# Patient Record
Sex: FEMALE | Employment: UNEMPLOYED | URBAN - METROPOLITAN AREA
[De-identification: names, ages, dates, MRNs, and addresses within clinical notes are randomized per-mention and may not be internally consistent; named-entity substitution may affect disease eponyms.]

---

## 2022-12-09 ENCOUNTER — OFFICE VISIT (OUTPATIENT)
Dept: ORTHOPEDIC SURGERY | Age: 47
End: 2022-12-09
Payer: OTHER GOVERNMENT

## 2022-12-09 VITALS — BODY MASS INDEX: 20.89 KG/M2 | HEIGHT: 66 IN | WEIGHT: 130 LBS

## 2022-12-09 DIAGNOSIS — M25.562 LEFT KNEE PAIN, UNSPECIFIED CHRONICITY: Primary | ICD-10-CM

## 2022-12-09 DIAGNOSIS — S83.242A ACUTE MEDIAL MENISCUS TEAR OF LEFT KNEE, INITIAL ENCOUNTER: ICD-10-CM

## 2022-12-09 NOTE — PROGRESS NOTES
Elana Martins (: 1975) is a 52 y.o. female, patient, here for evaluation of the following chief complaint(s):  Knee Pain (Left knee pain started a few months ago /No injury or fall reported/Unable to do any lower body exercises due to pain )       HPI:    If complaint is left knee pain. Patient's issue began in 2022. No injury. She is very active. Exercises most days. Pain localizes to the medial side of her left knee. It is associated with mechanical symptoms of clicking and catching. No Known Allergies    No current outpatient medications on file. No current facility-administered medications for this visit. Past Medical History:   Diagnosis Date    Endometriosis     resolved        History reviewed. No pertinent surgical history. History reviewed. No pertinent family history. Social History     Socioeconomic History    Marital status:      Spouse name: Not on file    Number of children: Not on file    Years of education: Not on file    Highest education level: Not on file   Occupational History    Not on file   Tobacco Use    Smoking status: Never     Passive exposure: Never    Smokeless tobacco: Never   Vaping Use    Vaping Use: Never used   Substance and Sexual Activity    Alcohol use: Never    Drug use: Never    Sexual activity: Not on file   Other Topics Concern    Not on file   Social History Narrative    Not on file     Social Determinants of Health     Financial Resource Strain: Not on file   Food Insecurity: Not on file   Transportation Needs: Not on file   Physical Activity: Not on file   Stress: Not on file   Social Connections: Not on file   Intimate Partner Violence: Not on file   Housing Stability: Not on file       ROS    Positive for: Musculoskeletal  Last edited by Jace Dean on 2022  2:49 PM.            Vitals:  Ht 5' 6\" (1.676 m)   Wt 130 lb (59 kg)   BMI 20.98 kg/m²    Body mass index is 20.98 kg/m².     PHYSICAL EXAM:  Exam  of left lower extremity shows a painless left hip. Alignment of the left knee is neutral.  There is no instability. She has a mild effusion. Pain localizes to the medial joint. Patellofemoral joint has no crepitation. Negative patellofemoral grind test.  She has a positive medial Teri's test.    Left lower extremity sensate and well perfused. IMAGING:  XR Results (most recent):  Results from Appointment encounter on 12/09/22    XR KNEE LT 3 V    Narrative  Views left knee. Normal joint space. No evidence of avascular necrosis, fracture, acute process. ASSESSMENT/PLAN:  1. Left knee pain, unspecified chronicity  -     XR KNEE LT 3 V; Future  -     MRI KNEE LT WO CONT; Future    Nearing 7 months of symptoms and active healthy person. Mechanical symptoms as well as a positive medial Teri's test.  Normal x-rays. MRI left knee was ordered. We will make arrangements for the patient to follow-up with one of the sports medicine physicians. An electronic signature was used to authenticate this note.   --Noah Donaldson MD

## 2022-12-29 ENCOUNTER — OFFICE VISIT (OUTPATIENT)
Dept: ORTHOPEDIC SURGERY | Age: 47
End: 2022-12-29
Payer: OTHER GOVERNMENT

## 2022-12-29 VITALS — BODY MASS INDEX: 20.89 KG/M2 | WEIGHT: 130 LBS | HEIGHT: 66 IN

## 2022-12-29 DIAGNOSIS — S83.242A ACUTE MEDIAL MENISCUS TEAR OF LEFT KNEE, INITIAL ENCOUNTER: Primary | ICD-10-CM

## 2022-12-29 PROCEDURE — 99214 OFFICE O/P EST MOD 30 MIN: CPT | Performed by: ORTHOPAEDIC SURGERY

## 2022-12-29 NOTE — PROGRESS NOTES
Felicita Leblanc (: 1975) is a 52 y.o. female, patient, here for evaluation of the following chief complaint(s):  Knee Pain (left)       HPI:    Chief complaint is left knee pain. Is an MRI shows medial meniscus tear. Her pain localizes to her medial meniscus. She has had persistent symptoms not responding to exercise program as well as NSAIDs. She presents today to discuss her MRI results. She is inclined to have surgery as she is very active and this has significantly limited her activities of daily living. No Known Allergies    No current outpatient medications on file. No current facility-administered medications for this visit. Past Medical History:   Diagnosis Date    Endometriosis     resolved        History reviewed. No pertinent surgical history. History reviewed. No pertinent family history. Social History     Socioeconomic History    Marital status:      Spouse name: Not on file    Number of children: Not on file    Years of education: Not on file    Highest education level: Not on file   Occupational History    Not on file   Tobacco Use    Smoking status: Never     Passive exposure: Never    Smokeless tobacco: Never   Vaping Use    Vaping Use: Never used   Substance and Sexual Activity    Alcohol use: Never    Drug use: Never    Sexual activity: Not on file   Other Topics Concern    Not on file   Social History Narrative    Not on file     Social Determinants of Health     Financial Resource Strain: Not on file   Food Insecurity: Not on file   Transportation Needs: Not on file   Physical Activity: Not on file   Stress: Not on file   Social Connections: Not on file   Intimate Partner Violence: Not on file   Housing Stability: Not on file             Vitals:  Ht 5' 6\" (1.676 m)   Wt 130 lb (59 kg)   BMI 20.98 kg/m²    Body mass index is 20.98 kg/m².     PHYSICAL EXAM:  On physical examination left knee has a positive medial Teri's test and pain along the medial joint line. Patellofemoral joint is negative. She has a small knee effusion. IMAGING:  No new imaging today. MRI report is documented. Her x-rays show stage 0 osteoarthritis of the left knee. ASSESSMENT/PLAN:  1. Acute medial meniscus tear of left knee, initial encounter  Persistent symptoms. Not responding to conservative treatment. MRI shows meniscus tear medially. Her symptoms location and dysfunction can all be attributed to the MRI findings. She would like to proceed with surgery. I did discuss a shot of corticosteroid today but she would rather have something definitive done and get back to her usual activities. Scheduled for arthroscopy left knee partial medial meniscectomy versus repair today. An electronic signature was used to authenticate this note.   --Vasyl Richardson MD

## 2023-01-04 DIAGNOSIS — S83.242A ACUTE MEDIAL MENISCUS TEAR OF LEFT KNEE, INITIAL ENCOUNTER: Primary | ICD-10-CM

## 2023-01-26 NOTE — H&P
Marko Mac (: 1975) is a 52 y.o. female, patient, here for evaluation of the following chief complaint(s):  Knee Pain (left)        HPI:     Chief complaint is left knee pain. Is an MRI shows medial meniscus tear. Her pain localizes to her medial meniscus. She has had persistent symptoms not responding to exercise program as well as NSAIDs. She presents today to discuss her MRI results. She is inclined to have surgery as she is very active and this has significantly limited her activities of daily living. No Known Allergies     No current outpatient medications on file. No current facility-administered medications for this visit. Past Medical History:   Diagnosis Date    Endometriosis       resolved         History reviewed. No pertinent surgical history. History reviewed. No pertinent family history. Social History            Socioeconomic History    Marital status:        Spouse name: Not on file    Number of children: Not on file    Years of education: Not on file    Highest education level: Not on file   Occupational History    Not on file   Tobacco Use    Smoking status: Never       Passive exposure: Never    Smokeless tobacco: Never   Vaping Use    Vaping Use: Never used   Substance and Sexual Activity    Alcohol use: Never    Drug use: Never    Sexual activity: Not on file   Other Topics Concern    Not on file   Social History Narrative    Not on file      Social Determinants of Health      Financial Resource Strain: Not on file   Food Insecurity: Not on file   Transportation Needs: Not on file   Physical Activity: Not on file   Stress: Not on file   Social Connections: Not on file   Intimate Partner Violence: Not on file   Housing Stability: Not on file                  Vitals:  Ht 5' 6\" (1.676 m)   Wt 130 lb (59 kg)   BMI 20.98 kg/m²    Body mass index is 20.98 kg/m².      PHYSICAL EXAM:  On physical examination left knee has a positive medial Teri's test and pain along the medial joint line. Patellofemoral joint is negative. She has a small knee effusion. IMAGING:  No new imaging today. MRI report is documented. Her x-rays show stage 0 osteoarthritis of the left knee. ASSESSMENT/PLAN:  1. Acute medial meniscus tear of left knee, initial encounter  Persistent symptoms. Not responding to conservative treatment. MRI shows meniscus tear medially. Her symptoms location and dysfunction can all be attributed to the MRI findings. She would like to proceed with surgery. I did discuss a shot of corticosteroid today but she would rather have something definitive done and get back to her usual activities. Scheduled for arthroscopy left knee partial medial meniscectomy versus repair today. An electronic signature was used to authenticate this note.   --Lizy Dominguez MD Rifampin Pregnancy And Lactation Text: This medication is Pregnancy Category C and it isn't know if it is safe during pregnancy. It is also excreted in breast milk and should not be used if you are breast feeding.

## 2023-01-27 ENCOUNTER — HOSPITAL ENCOUNTER (OUTPATIENT)
Dept: PREADMISSION TESTING | Age: 48
End: 2023-01-27
Payer: OTHER GOVERNMENT

## 2023-01-27 VITALS
DIASTOLIC BLOOD PRESSURE: 68 MMHG | HEART RATE: 62 BPM | TEMPERATURE: 97.8 F | HEIGHT: 66 IN | SYSTOLIC BLOOD PRESSURE: 105 MMHG | BODY MASS INDEX: 21.9 KG/M2 | WEIGHT: 136.24 LBS

## 2023-01-27 LAB — HCG SERPL QL: NEGATIVE

## 2023-01-27 PROCEDURE — 36415 COLL VENOUS BLD VENIPUNCTURE: CPT

## 2023-01-27 PROCEDURE — 84703 CHORIONIC GONADOTROPIN ASSAY: CPT

## 2023-01-27 NOTE — PERIOP NOTES
[address] [facility] INSTRUCTIONS  ORTHOPAEDIC    Surgery Date:   2/3/23    Your surgeon's office or [facility] staff will call you between 4 PM- 8 PM the day before surgery with your arrival time. If your surgery is on a Monday, you will receive a call the preceding Friday. Please report to [facility] Patient Access/Admitting on the 1st floor. Bring your insurance card, photo identification, and any copayment (if applicable). If you are going home the same day of your surgery, you must have a responsible adult to drive you home. You need to have a responsible adult to stay with you the first 24 hours after surgery and you should not drive a car for 24 hours following your surgery. Do NOT eat any solid foods after midnight the night before surgery including candy, mints or gum. You may drink clear liquids from midnight until 1 hour prior to arrival time. You may drink up to 12 ounces at one time every 4 hours. Do NOT drink alcohol or smoke 24 hours before surgery. STOP smoking for 14 days prior as it helps with breathing and healing after surgery. If your arrival time is 3pm or later, you may eat a light breakfast before 8am (toast, bagel-no butter, black coffee, plain tea, fruit juice-no pulp) Please note special instructions, if applicable, below for medications. If you are being admitted to the hospital,please leave personal belongings/luggage in your car until you have an assigned hospital room number. Please wear comfortable clothes. Wear your glasses instead of contacts. We ask that all money, jewelry and valuables be left at home. Wear no make up, particularly mascara, the day of surgery. All body piercings, rings, and jewelry need to be removed and left at home. Please remove any nail polish or artificial nails from your fingernails. Please wear your hair loose or down. Please no pony-tails, buns, or any metal hair accessories.  If you shower the morning of surgery, please do not apply any lotions or powders afterwards. You may wear deodorant. Do not shave any body area within 24 hours of your surgery. Please follow all instructions to avoid any potential surgical cancellation. Should your physical condition change, (i.e. fever, cold, flu, etc.) please notify your surgeon as soon as possible. It is important to be on time. If a situation occurs where you may be delayed, please call:  [phone] / [phone] on the day of surgery. The Preadmission Testing staff can be reached at [phone]. Special instructions: NONE    No current facility-administered medications for this encounter. No current outpatient medications on file. YOU MUST ONLY TAKE THESE MEDICATIONS THE MORNING OF SURGERY WITH A SIP OF WATER: NONE  MEDICATIONS TO TAKE THE MORNING OF SURGERY ONLY IF NEEDED: NONE  HOLD these prescription medications BEFORE Surgery: NONE  Ask your surgeon/prescribing physician about when/if to STOP taking these medications: NONE  Stop any non-steroidal anti-inflammatory drugs (i.e. Ibuprofen, Naproxen, Advil, Aleve) 3 days before surgery. You may take Tylenol. STOP all vitamins and herbal supplements 1 week prior to  surgery. If you are currently taking Plavix, Coumadin, or any other blood-thinning/anticoagulant medication contact your prescribing physician for instructions. Preventing Infections Before and After - Your Surgery    IMPORTANT INSTRUCTIONS    You play an important role in your health and preparation for surgery. To reduce the germs on your skin you will need to shower with CHG soap (Chorhexidine gluconate 4%) two times before surgery. CHG soap (Hibiclens, Hex-A-Clens or store brand)  CHG soap will be provided at your Preadmission Testing (PAT) appointment. If you do not have a PAT appointment before surgery, you may arrange to  CHG soap from our office or purchase CHG soap at a pharmacy, grocery or department store.   You need to purchase TWO 4 ounce bottles to use for your 2 showers. Steps to follow:  [place] your hair with your normal shampoo and your body with regular soap and rinse well to remove shampoo and soap from your skin. Wet a clean washcloth and turn off the shower. Put CHG soap on washcloth and apply to your entire body from the neck down. Do not use on your head, face or private parts(genitals). Do not use CHG soap on open sores, wounds or areas of skin irritation. Wash you body gently for 5 minutes. Do not wash your skin too hard. This soap does not create lather. Pay special attention to your underarms and from your belly button to your feet. Turn the shower back on and rinse well to get CHG soap off your body. Pat your skin dry with a clean, dry towel. Do not apply lotions or moisturizer. Put on clean clothes and sleep on fresh bed sheets and do not allow pets to sleep with you. Shower with CHG soap 2 times before your surgery  The evening before your surgery  The morning of your surgery      Tips to help prevent infections after your surgery:  Protect your surgical wound from germs:  Hand washing is the most important thing you and your caregivers can do to prevent infections. Keep your bandage clean and dry! Do not touch your surgical wound. Use clean, freshly washed towels and washcloths every time you shower; do not share bath linens with others. Until your surgical wound is healed, wear clothing and sleep on bed linens each day that are clean and freshly washed. Do not allow pets to sleep in your bed with you or touch your surgical wound. Do not smoke - smoking delays wound healing. This may be a good time to stop smoking. If you have diabetes, it is important for you to manage your blood sugar levels properly before your surgery as well as after your surgery. Poorly managed blood sugar levels slow down wound healing and prevent you from healing completely.     CHG liquid soap (Chlorhexidine 4%, Hibiclens, Connie-A-Clens or store brand) at a pharmacy or grocery store. Wash your hair with your normal shampoo and your body with regular soap and rinse well to remove shampoo and soap from your skin. Wet a clean washcloth and turn off the shower. Put CHG soap on washcloth and apply to your entire body from the neck down. Do not use on your head, face or private parts(genitals). Do not use CHG soap on open sores, wounds or areas of skin irritation. Wash your body gently for 5 minutes. Do not wash your skin too hard. This soap does not create lather. Pay special attention to your underarms and from your belly button to your feet. Turn the shower back on and rinse well to get CHG soap off your body. Pat your skin dry with a clean, dry towel. Do not apply lotions or moisturizer. Put on clean clothes and sleep on fresh bed sheets the night before surgery. Do not allow pets to sleep with you. Eating and Drinking Before Surgery    You may eat a regular dinner at the usual time on the day before your surgery. Do NOT eat any solid foods after midnight unless your arrival time at the hospital is 3pm or later. You may drink clear liquids only from 12 midnight until 1 hours prior to your arrival time at the hospital on the day of your surgery. Do NOT drink alcohol. Clear liquids include:  Water  Fruit juices without pulp( i.e. apple juice)  Carbonated beverages  Black coffee (no cream/milk)  Tea (no cream/milk)  Gatorade  You may drink up to 12-16 ounces at one time every 4 hours between the hours of midnight and 1 hour before your arrival time at the hospital. Example- if your arrival time at the hospital is 6am, you may drink 12-16 ounces of clear liquids no later than 5am.  If your arrival time at the hospital is 3pm or later, you may eat a light breakfast before 8am.  A light breakfast includes:   Toast or bagel (no butter)  Black coffee (no cream/milk)  Tea (no cream/milk)  Fruit juices without pulp ( i.e. apple juice)  Do NOT eat meat, eggs, vegetables or fruit  If you have any questions, please contact your surgeon's office. Patient Information Regarding COVID Restrictions    Day of Procedure    Please park in the parking deck or any designated visitor parking lot. Enter the facility through the Main Entrance of the hospital.  On the day of surgery, please provide the cell phone number of the person who will be waiting for you to the Patient Access representative at the time of registration. Masks are highly recommended in the hospital, but not required. Once your procedure and the immediate recovery period is completed, a nurse in the recovery area will contact your designated visitor to inform them of your room number or to otherwise review other pertinent information regarding your care. Social distancing practices are strongly encouraged in waiting areas and the cafeteria. The patient was contacted in person. She verbalized understanding of all instructions does not  need reinforcement.

## 2023-01-30 NOTE — PROGRESS NOTES
Subjective: (As above and below)     Chief Complaint   Patient presents with    Establish Care    Pre-op Exam       Dennis Jc is a 52 y.o. female with no significant medical history who presents to the office to establish care and pre-op clearance. Preventative:   LMP: Started sky-menopause, skipping months. Pap smear: [facility]. Mammogram: No family history of breast cancer. Colonoscopy: Due   Immunization:   Refused all vaccinations at this time. Current medications: Only taking supplements. Vitamin A, B, C, D, E, selenium, CoQ10, K2, beef liver, probiotics, B3, B-complex, calcium, others she cannot remember but has stopped taking supplements 1 week prior to surgery. No other concerns today. Nutrition Hx:  Diet: Breakfast: smoothie and supplements, Lunch, Salad and organic protein, Dinner: vegetables, protein Snacks: nuts. Makes her own bake goods, cheese, chocolate. Water, tea and milk. Exercise: Walks everyday. Unable to run due to meniscus tear      Preo-op:   Procedure/Surgery: Left knee scope   Date of Procedure/Surgery: 2/3/2023   Surgeon: Dr. Mahajan Sharonda: [address]   Primary Physician: None       Reason for surgery: Meniscus repair of left knee    Latex Allergy: No   Recent use of: No recent use of aspirin (ASA), NSAIDS or steroids  Tetanus up to date: tetanus status unknown to the patient  Anesthesia Complications: None  History of abnormal bleeding : None  History of Blood Transfusions: no  Health Care Directive or Living Will: no  Denies KILEY symptoms. Denies any history or cardiac disease or family history of cardiac disease. Per patient provider did not need EKG or lab work prior to surgery. Current medications: Only taking supplements.    Vitamin A, B, C, D, E, selenium, CoQ10, K2, beef liver, probiotics, B3, B-complex, calcium, others she cannot remember but has stopped taking supplements 1 week prior to surgery. Labs: Routine labs done today. Reviewed and agree with Nurse Note duplicated in this note. Reviewed PmHx, RxHx, FmHx, SocHx, AllgHx and updated in chart. No current outpatient medications on file. No current facility-administered medications for this visit.       No Known Allergies   Past Medical History:   Diagnosis Date    Endometriosis     resolved      Past Surgical History:   Procedure Laterality Date    HX GYN  1997    LAPAROSCOPIC SURGERY FOR ENDOMETRIOSIS    HX HEENT Bilateral 2019    LASIK      Family History   Problem Relation Age of Onset    Cancer Mother         B CELL LYMPHOMA    Mitral valve prolapse Father     Cancer Brother         bone cancer    Anesth Problems Neg Hx       Social History     Socioeconomic History    Marital status:      Spouse name: Not on file    Number of children: Not on file    Years of education: Not on file    Highest education level: Not on file   Occupational History    Not on file   Tobacco Use    Smoking status: Never     Passive exposure: Never    Smokeless tobacco: Never   Vaping Use    Vaping Use: Never used   Substance and Sexual Activity    Alcohol use: Never    Drug use: Never    Sexual activity: Yes     Partners: Male     Birth control/protection: None     Comment: Only with my  of 26 years   Other Topics Concern    Not on file   Social History Narrative    Not on file     Social Determinants of Health     Financial Resource Strain: Not on file   Food Insecurity: Not on file   Transportation Needs: Not on file   Physical Activity: Sufficiently Active    Days of Exercise per Week: 7 days    Minutes of Exercise per Session: 60 min   Stress: Not on file   Social Connections: Not on file   Intimate Partner Violence: Not At Risk    Fear of Current or Ex-Partner: No    Emotionally Abused: No    Physically Abused: No    Sexually Abused: No   Housing Stability: Not on file       Review of Systems   Constitutional: Negative for chills, diaphoresis, fever, malaise/fatigue and weight loss. HENT: Negative. Eyes: Negative. Respiratory:  Negative for cough, hemoptysis, sputum production, shortness of breath and wheezing. Cardiovascular:  Negative for chest pain, palpitations, orthopnea, claudication, leg swelling and PND. Gastrointestinal:  Negative for abdominal pain, blood in stool, constipation, diarrhea, heartburn, melena, nausea and vomiting. Genitourinary:  Negative for dysuria, flank pain, frequency, hematuria and urgency. Musculoskeletal: Negative. Skin: Negative. Neurological:  Negative for dizziness, tingling, tremors, sensory change, speech change, focal weakness, seizures, loss of consciousness, weakness and headaches. Endo/Heme/Allergies: Negative. Psychiatric/Behavioral: Negative. Objective:     Physical Examination:      Visit Vitals  BP (!) 99/49 (BP 1 Location: Left upper arm, BP Patient Position: Sitting, BP Cuff Size: Adult)   Pulse 69   Temp 98.3 °F (36.8 °C) (Temporal)   Resp 17   Ht 5' 6\" (1.676 m)   Wt 138 lb (62.6 kg)   LMP 01/28/2023   SpO2 100%   BMI 22.27 kg/m²     Gen: alert, oriented, no acute distress  Ears: external auditory canals clear, TMs without erythema or effusion  Eyes: pupils equal round reactive to light, sclera clear, conjunctiva clear  Oral: moist mucus membranes, no oral lesions, no pharyngeal inflammation or exudate  Neck: thyroid symmetric and not enlarged, no carotid bruits, no jugular vein distention  Resp: no increase work of breathing, lungs clear to ausculation bilaterally, no wheezing, rales or rhonchi  CV: S1, S2 normal. No murmurs, rubs, or gallops. Abd: soft, not tender, not distended. No hepatosplenomegaly. Normal bowel sounds. No hernias. Neuro: cranial nerves intact, normal strength and movement in all extremities, reflexes and sensation intact and symmetric.   Skin: no lesion or rash  Extremities: no cyanosis or edema             3 most recent PHQ Screens 2/1/2023   Little interest or pleasure in doing things Not at all   Feeling down, depressed, irritable, or hopeless Not at all   Total Score PHQ 2 0      Assessment/ Plan:   Differential diagnosis and treatment options reviewed with patient who is in agreement with treatment plan as outlined below. 1. Encounter for medical examination to establish care  Vitals are stable. Patient is asymptomatic with lower diastolic's. Will get release form for pap smear. 2. Preoperative clearance  Encourage patient to be transparent with surgeon and anesthesiologist. Ask questions. Patient denied any cardiac disease, respiratory disease or family history of cardiac/respiratory disease. After reviewing the patient's history & exam she is low risk for cardiac complications. No contraindications to planned surgery     3. Need for hepatitis C screening test  - HEPATITIS C AB; Future    4. Encounter for completion of form with patient    5. Encounter for screening for lipid disorder  - LIPID PANEL; Future    6. Laboratory exam ordered as part of routine general medical examination  - CBC WITH AUTOMATED DIFF; Future  - METABOLIC PANEL, COMPREHENSIVE; Future  - HEMOGLOBIN A1C WITH EAG; Future  - TSH 3RD GENERATION; Future  - AMB POC URINALYSIS DIP STICK AUTO W/O MICRO    7. Colon cancer screening  - OCCULT BLOOD IMMUNOASSAY,DIAGNOSTIC; Future  - OCCULT BLOOD IMMUNOASSAY,DIAGNOSTIC        Follow-up and Dispositions    Return in about 1 year (around 2/1/2024), or if symptoms worsen or fail to improve, for yearly visit . Medication Side Effects and Warnings were discussed with patient: yes  Patient Labs were reviewed: yes  Patient Past Records were reviewed:  yes    Verbal and written instructions (see AVS) provided. Patient expresses understanding and agreement of diagnosis and treatment plan.     Brianna Greenwood NP

## 2023-01-31 NOTE — DISCHARGE INSTRUCTIONS
Knee Arthroscopy Discharge Instructions  Dr. Danuta Donis    Activity:  You should be as active as you can tolerate within the guidelines you have been given. Perform the exercises you have been given twice daily. Use you walker or crutches as directed by your physician. You should rest for one hour twice a day with your feet elevated. Diet:  You may resume your regular home diet. If you are on Coumadin, please be consistent eating green leafy vegetables day to day (Green leafy vegetables contain Vitamin K, which does the opposite affect of Coumadin). You CAN eat them, but again, be consistent day to day. One serving per day is recommended. Pain:  You have been given a prescription for pain control. Take this medication as  directed. Do not take more than prescribed. If your pain is not controlled by the medication you were given, please call your surgeons office. Anticoagulation:   You will need to take Aspirin enteric coated 81 mg once a day for two weeks from the day of surgery. Stool Softeners:  If it important to have regular bowel movements. Pain medications can cause constipation. Stool softeners, warm prune juice, increasing your water intake, and increasing fiber can help in preventing constipation. Do not take laxatives if at all possible except in severe situations. It can result in a vicious cycle between constipation and diarrhea. Ice Pack   Application of ice will prevent and treat inflammation. You should use this three times a day for 20 minutes as needed. Skin Care:  Dressing Changes: You may remove your dressings the day following surgery and place Band Aids over the wound site. Showering: You may shower now. Keep the wound covered and change dressing afterwards, avoiding direct water pressure to the dressing.  NO baths, hot tubs, pools, etc.      Follow up Care:    When you arrive home, please call your surgeon to schedule your follow-up appointment if not already set up for you. Reasons to call your surgeon:    Fever above 101 for more than 24 hours. Persistent pain in the calf or either leg. Pain uncontrolled by your pain medication. Nausea or vomiting  Redness, swelling or drainage from your incision. Numbness, tingling, or change in your affected extremity. ______________________________________________________________________    Anesthesia Discharge Instructions    After general anesthesia or intervenous sedation, for 24 hours or while taking prescription Narcotics:  Limit your activities  Do not drive or operate hazardous machinery  If you have not urinated within 8 hours after discharge, please contact your surgeon on call. Do not make important personal or business decisions  Do not drink alcoholic beverages    Report the following to your surgeon:  Excessive pain, swelling, redness or odor of or around the surgical area  Temperature over 100.5 degrees  Nausea and vomiting lasting longer than 4 hours or if unable to take medication  Any signs of decreased circulation or nerve impairment to extremity:  Change in color, persistent numbness, tingling, coldness or increased pain.   Any questions          ** you had Oxycodone 5mg tablet at 12:23 pm, you can have your next pill at 4:23 pm or later today**

## 2023-02-01 ENCOUNTER — OFFICE VISIT (OUTPATIENT)
Dept: FAMILY MEDICINE CLINIC | Age: 48
End: 2023-02-01
Payer: OTHER GOVERNMENT

## 2023-02-01 VITALS
BODY MASS INDEX: 22.18 KG/M2 | TEMPERATURE: 98.3 F | OXYGEN SATURATION: 100 % | WEIGHT: 138 LBS | HEART RATE: 69 BPM | SYSTOLIC BLOOD PRESSURE: 99 MMHG | DIASTOLIC BLOOD PRESSURE: 49 MMHG | HEIGHT: 66 IN | RESPIRATION RATE: 17 BRPM

## 2023-02-01 DIAGNOSIS — Z12.11 COLON CANCER SCREENING: ICD-10-CM

## 2023-02-01 DIAGNOSIS — Z02.89 ENCOUNTER FOR COMPLETION OF FORM WITH PATIENT: ICD-10-CM

## 2023-02-01 DIAGNOSIS — Z13.220 ENCOUNTER FOR SCREENING FOR LIPID DISORDER: ICD-10-CM

## 2023-02-01 DIAGNOSIS — Z00.00 LABORATORY EXAM ORDERED AS PART OF ROUTINE GENERAL MEDICAL EXAMINATION: ICD-10-CM

## 2023-02-01 DIAGNOSIS — Z01.818 PREOPERATIVE CLEARANCE: ICD-10-CM

## 2023-02-01 DIAGNOSIS — Z00.00 ENCOUNTER FOR MEDICAL EXAMINATION TO ESTABLISH CARE: Primary | ICD-10-CM

## 2023-02-01 DIAGNOSIS — Z11.59 NEED FOR HEPATITIS C SCREENING TEST: ICD-10-CM

## 2023-02-01 NOTE — PROGRESS NOTES
Chief Complaint   Patient presents with    Establish Care    Pre-op Exam     Health Maintenance reviewed     1. Have you been to the ER, urgent care clinic since your last visit? Hospitalized since your last visit? No     2. Have you seen or consulted any other health care providers outside of the [address] [place] [address] since your last visit? Include any pap smears or colon screening.   No

## 2023-02-02 ENCOUNTER — ANESTHESIA EVENT (OUTPATIENT)
Dept: SURGERY | Age: 48
End: 2023-02-02
Payer: OTHER GOVERNMENT

## 2023-02-02 LAB
ALBUMIN SERPL-MCNC: 4.1 G/DL (ref 3.5–5)
ALBUMIN/GLOB SERPL: 1.4 (ref 1.1–2.2)
ALP SERPL-CCNC: 44 U/L (ref 45–117)
ALT SERPL-CCNC: 22 U/L (ref 12–78)
ANION GAP SERPL CALC-SCNC: 1 MMOL/L (ref 5–15)
AST SERPL-CCNC: 10 U/L (ref 15–37)
BASOPHILS # BLD: 0 K/UL (ref 0–0.1)
BASOPHILS NFR BLD: 0 % (ref 0–1)
BILIRUB SERPL-MCNC: 0.4 MG/DL (ref 0.2–1)
BUN SERPL-MCNC: 17 MG/DL (ref 6–20)
BUN/CREAT SERPL: 26 (ref 12–20)
CALCIUM SERPL-MCNC: 9.2 MG/DL (ref 8.5–10.1)
CHLORIDE SERPL-SCNC: 108 MMOL/L (ref 97–108)
CHOLEST SERPL-MCNC: 191 MG/DL
CO2 SERPL-SCNC: 31 MMOL/L (ref 21–32)
CREAT SERPL-MCNC: 0.65 MG/DL (ref 0.55–1.02)
DIFFERENTIAL METHOD BLD: NORMAL
EOSINOPHIL # BLD: 0.1 K/UL (ref 0–0.4)
EOSINOPHIL NFR BLD: 2 % (ref 0–7)
ERYTHROCYTE [DISTWIDTH] IN BLOOD BY AUTOMATED COUNT: 12.8 % (ref 11.5–14.5)
EST. AVERAGE GLUCOSE BLD GHB EST-MCNC: 97 MG/DL
GLOBULIN SER CALC-MCNC: 2.9 G/DL (ref 2–4)
GLUCOSE SERPL-MCNC: 84 MG/DL (ref 65–100)
HBA1C MFR BLD: 5 % (ref 4–5.6)
HCT VFR BLD AUTO: 42.1 % (ref 35–47)
HCV AB SERPL QL IA: NONREACTIVE
HDLC SERPL-MCNC: 76 MG/DL
HDLC SERPL: 2.5 (ref 0–5)
HGB BLD-MCNC: 13.6 G/DL (ref 11.5–16)
IMM GRANULOCYTES # BLD AUTO: 0 K/UL (ref 0–0.04)
IMM GRANULOCYTES NFR BLD AUTO: 0 % (ref 0–0.5)
LDLC SERPL CALC-MCNC: 103.8 MG/DL (ref 0–100)
LYMPHOCYTES # BLD: 1.3 K/UL (ref 0.8–3.5)
LYMPHOCYTES NFR BLD: 23 % (ref 12–49)
MCH RBC QN AUTO: 30.2 PG (ref 26–34)
MCHC RBC AUTO-ENTMCNC: 32.3 G/DL (ref 30–36.5)
MCV RBC AUTO: 93.6 FL (ref 80–99)
MONOCYTES # BLD: 0.3 K/UL (ref 0–1)
MONOCYTES NFR BLD: 6 % (ref 5–13)
NEUTS SEG # BLD: 3.8 K/UL (ref 1.8–8)
NEUTS SEG NFR BLD: 69 % (ref 32–75)
NRBC # BLD: 0 K/UL (ref 0–0.01)
NRBC BLD-RTO: 0 PER 100 WBC
PLATELET # BLD AUTO: 231 K/UL (ref 150–400)
PMV BLD AUTO: 9.7 FL (ref 8.9–12.9)
POTASSIUM SERPL-SCNC: 4.3 MMOL/L (ref 3.5–5.1)
PROT SERPL-MCNC: 7 G/DL (ref 6.4–8.2)
RBC # BLD AUTO: 4.5 M/UL (ref 3.8–5.2)
SODIUM SERPL-SCNC: 140 MMOL/L (ref 136–145)
TRIGL SERPL-MCNC: 56 MG/DL (ref ?–150)
TSH SERPL DL<=0.05 MIU/L-ACNC: 1.36 UIU/ML (ref 0.36–3.74)
VLDLC SERPL CALC-MCNC: 11.2 MG/DL
WBC # BLD AUTO: 5.6 K/UL (ref 3.6–11)

## 2023-02-03 ENCOUNTER — HOSPITAL ENCOUNTER (OUTPATIENT)
Age: 48
Discharge: HOME | End: 2023-02-03
Attending: ORTHOPAEDIC SURGERY | Admitting: ORTHOPAEDIC SURGERY
Payer: OTHER GOVERNMENT

## 2023-02-03 ENCOUNTER — ANESTHESIA (OUTPATIENT)
Dept: SURGERY | Age: 48
End: 2023-02-03
Payer: OTHER GOVERNMENT

## 2023-02-03 VITALS
RESPIRATION RATE: 16 BRPM | BODY MASS INDEX: 21.69 KG/M2 | HEIGHT: 66 IN | WEIGHT: 135 LBS | TEMPERATURE: 98 F | DIASTOLIC BLOOD PRESSURE: 62 MMHG | OXYGEN SATURATION: 100 % | SYSTOLIC BLOOD PRESSURE: 107 MMHG | HEART RATE: 55 BPM

## 2023-02-03 DIAGNOSIS — S83.242D ACUTE MEDIAL MENISCUS TEAR, LEFT, SUBSEQUENT ENCOUNTER: ICD-10-CM

## 2023-02-03 DIAGNOSIS — Z98.890 S/P LEFT KNEE ARTHROSCOPY: Primary | ICD-10-CM

## 2023-02-03 PROBLEM — S83.249A ACUTE MEDIAL MENISCUS TEAR: Status: ACTIVE | Noted: 2023-02-03

## 2023-02-03 LAB — HCG UR QL: NEGATIVE

## 2023-02-03 PROCEDURE — 2709999900 HC NON-CHARGEABLE SUPPLY: Performed by: ORTHOPAEDIC SURGERY

## 2023-02-03 PROCEDURE — 77030000032 HC CUF TRNQT ZIMM -B: Performed by: ORTHOPAEDIC SURGERY

## 2023-02-03 PROCEDURE — 74011250637 HC RX REV CODE- 250/637: Performed by: ANESTHESIOLOGY

## 2023-02-03 PROCEDURE — 74011250636 HC RX REV CODE- 250/636: Performed by: ANESTHESIOLOGY

## 2023-02-03 PROCEDURE — 81025 URINE PREGNANCY TEST: CPT

## 2023-02-03 PROCEDURE — 76010000138 HC OR TIME 0.5 TO 1 HR: Performed by: ORTHOPAEDIC SURGERY

## 2023-02-03 PROCEDURE — 74011000250 HC RX REV CODE- 250: Performed by: ORTHOPAEDIC SURGERY

## 2023-02-03 PROCEDURE — 76210000020 HC REC RM PH II FIRST 0.5 HR: Performed by: ORTHOPAEDIC SURGERY

## 2023-02-03 PROCEDURE — 76060000033 HC ANESTHESIA 1 TO 1.5 HR: Performed by: ORTHOPAEDIC SURGERY

## 2023-02-03 PROCEDURE — 77030002916 HC SUT ETHLN J&J -A: Performed by: ORTHOPAEDIC SURGERY

## 2023-02-03 PROCEDURE — 76210000000 HC OR PH I REC 2 TO 2.5 HR: Performed by: ORTHOPAEDIC SURGERY

## 2023-02-03 PROCEDURE — 77030006884 HC BLD SHV INCIS S&N -B: Performed by: ORTHOPAEDIC SURGERY

## 2023-02-03 PROCEDURE — 74011250637 HC RX REV CODE- 250/637: Performed by: ORTHOPAEDIC SURGERY

## 2023-02-03 PROCEDURE — 77030008753 HC TU IRR IN/OUT FLO S&N -B: Performed by: ORTHOPAEDIC SURGERY

## 2023-02-03 PROCEDURE — 77030018834: Performed by: ORTHOPAEDIC SURGERY

## 2023-02-03 RX ORDER — ONDANSETRON 2 MG/ML
INJECTION INTRAMUSCULAR; INTRAVENOUS AS NEEDED
Status: DISCONTINUED | OUTPATIENT
Start: 2023-02-03 | End: 2023-02-03 | Stop reason: HOSPADM

## 2023-02-03 RX ORDER — SODIUM CHLORIDE 0.9 % (FLUSH) 0.9 %
5-40 SYRINGE (ML) INJECTION EVERY 8 HOURS
Status: DISCONTINUED | OUTPATIENT
Start: 2023-02-03 | End: 2023-02-03 | Stop reason: HOSPADM

## 2023-02-03 RX ORDER — BUPIVACAINE HYDROCHLORIDE AND EPINEPHRINE 5; 5 MG/ML; UG/ML
INJECTION, SOLUTION EPIDURAL; INTRACAUDAL; PERINEURAL AS NEEDED
Status: DISCONTINUED | OUTPATIENT
Start: 2023-02-03 | End: 2023-02-03 | Stop reason: HOSPADM

## 2023-02-03 RX ORDER — HYDROMORPHONE HYDROCHLORIDE 1 MG/ML
0.2 INJECTION, SOLUTION INTRAMUSCULAR; INTRAVENOUS; SUBCUTANEOUS
Status: DISCONTINUED | OUTPATIENT
Start: 2023-02-03 | End: 2023-02-03 | Stop reason: HOSPADM

## 2023-02-03 RX ORDER — OXYCODONE HYDROCHLORIDE 5 MG/1
5-10 TABLET ORAL
Qty: 40 TABLET | Refills: 0 | Status: SHIPPED | OUTPATIENT
Start: 2023-02-03 | End: 2023-02-17

## 2023-02-03 RX ORDER — SODIUM CHLORIDE 9 MG/ML
1000 INJECTION, SOLUTION INTRAVENOUS CONTINUOUS
Status: DISCONTINUED | OUTPATIENT
Start: 2023-02-03 | End: 2023-02-03 | Stop reason: HOSPADM

## 2023-02-03 RX ORDER — CEFAZOLIN SODIUM 1 G/3ML
INJECTION, POWDER, FOR SOLUTION INTRAMUSCULAR; INTRAVENOUS AS NEEDED
Status: DISCONTINUED | OUTPATIENT
Start: 2023-02-03 | End: 2023-02-03 | Stop reason: HOSPADM

## 2023-02-03 RX ORDER — OXYCODONE AND ACETAMINOPHEN 5; 325 MG/1; MG/1
1 TABLET ORAL AS NEEDED
Status: DISCONTINUED | OUTPATIENT
Start: 2023-02-03 | End: 2023-02-03 | Stop reason: HOSPADM

## 2023-02-03 RX ORDER — SODIUM CHLORIDE 0.9 % (FLUSH) 0.9 %
5-40 SYRINGE (ML) INJECTION AS NEEDED
Status: DISCONTINUED | OUTPATIENT
Start: 2023-02-03 | End: 2023-02-03 | Stop reason: HOSPADM

## 2023-02-03 RX ORDER — DIPHENHYDRAMINE HYDROCHLORIDE 50 MG/ML
12.5 INJECTION, SOLUTION INTRAMUSCULAR; INTRAVENOUS AS NEEDED
Status: DISCONTINUED | OUTPATIENT
Start: 2023-02-03 | End: 2023-02-03 | Stop reason: HOSPADM

## 2023-02-03 RX ORDER — FENTANYL CITRATE 50 UG/ML
50 INJECTION, SOLUTION INTRAMUSCULAR; INTRAVENOUS AS NEEDED
Status: DISCONTINUED | OUTPATIENT
Start: 2023-02-03 | End: 2023-02-03 | Stop reason: HOSPADM

## 2023-02-03 RX ORDER — MIDAZOLAM HYDROCHLORIDE 1 MG/ML
1 INJECTION, SOLUTION INTRAMUSCULAR; INTRAVENOUS AS NEEDED
Status: DISCONTINUED | OUTPATIENT
Start: 2023-02-03 | End: 2023-02-03 | Stop reason: HOSPADM

## 2023-02-03 RX ORDER — OXYCODONE HYDROCHLORIDE 5 MG/1
5 TABLET ORAL ONCE
Status: COMPLETED | OUTPATIENT
Start: 2023-02-03 | End: 2023-02-03

## 2023-02-03 RX ORDER — MIDAZOLAM HYDROCHLORIDE 1 MG/ML
0.5 INJECTION, SOLUTION INTRAMUSCULAR; INTRAVENOUS
Status: DISCONTINUED | OUTPATIENT
Start: 2023-02-03 | End: 2023-02-03 | Stop reason: HOSPADM

## 2023-02-03 RX ORDER — PROPOFOL 10 MG/ML
INJECTION, EMULSION INTRAVENOUS AS NEEDED
Status: DISCONTINUED | OUTPATIENT
Start: 2023-02-03 | End: 2023-02-03 | Stop reason: HOSPADM

## 2023-02-03 RX ORDER — ACETAMINOPHEN 325 MG/1
650 TABLET ORAL ONCE
Status: COMPLETED | OUTPATIENT
Start: 2023-02-03 | End: 2023-02-03

## 2023-02-03 RX ORDER — MORPHINE SULFATE 2 MG/ML
2 INJECTION, SOLUTION INTRAMUSCULAR; INTRAVENOUS
Status: DISCONTINUED | OUTPATIENT
Start: 2023-02-03 | End: 2023-02-03 | Stop reason: HOSPADM

## 2023-02-03 RX ORDER — ONDANSETRON 2 MG/ML
4 INJECTION INTRAMUSCULAR; INTRAVENOUS AS NEEDED
Status: DISCONTINUED | OUTPATIENT
Start: 2023-02-03 | End: 2023-02-03 | Stop reason: HOSPADM

## 2023-02-03 RX ORDER — SODIUM CHLORIDE, SODIUM LACTATE, POTASSIUM CHLORIDE, CALCIUM CHLORIDE 600; 310; 30; 20 MG/100ML; MG/100ML; MG/100ML; MG/100ML
125 INJECTION, SOLUTION INTRAVENOUS CONTINUOUS
Status: DISCONTINUED | OUTPATIENT
Start: 2023-02-03 | End: 2023-02-03 | Stop reason: HOSPADM

## 2023-02-03 RX ORDER — AMOXICILLIN 250 MG
1 CAPSULE ORAL DAILY
Qty: 40 TABLET | Refills: 1 | Status: SHIPPED | OUTPATIENT
Start: 2023-02-03

## 2023-02-03 RX ORDER — FENTANYL CITRATE 50 UG/ML
25 INJECTION, SOLUTION INTRAMUSCULAR; INTRAVENOUS
Status: DISCONTINUED | OUTPATIENT
Start: 2023-02-03 | End: 2023-02-03 | Stop reason: HOSPADM

## 2023-02-03 RX ORDER — LIDOCAINE HYDROCHLORIDE 10 MG/ML
0.1 INJECTION, SOLUTION EPIDURAL; INFILTRATION; INTRACAUDAL; PERINEURAL AS NEEDED
Status: DISCONTINUED | OUTPATIENT
Start: 2023-02-03 | End: 2023-02-03 | Stop reason: HOSPADM

## 2023-02-03 RX ORDER — SODIUM CHLORIDE 9 MG/ML
50 INJECTION, SOLUTION INTRAVENOUS CONTINUOUS
Status: DISCONTINUED | OUTPATIENT
Start: 2023-02-03 | End: 2023-02-03 | Stop reason: HOSPADM

## 2023-02-03 RX ORDER — MORPHINE SULFATE 0.5 MG/ML
INJECTION, SOLUTION EPIDURAL; INTRATHECAL; INTRAVENOUS AS NEEDED
Status: DISCONTINUED | OUTPATIENT
Start: 2023-02-03 | End: 2023-02-03 | Stop reason: HOSPADM

## 2023-02-03 RX ORDER — FENTANYL CITRATE 50 UG/ML
INJECTION, SOLUTION INTRAMUSCULAR; INTRAVENOUS
Status: DISCONTINUED
Start: 2023-02-03 | End: 2023-02-03 | Stop reason: HOSPADM

## 2023-02-03 RX ADMIN — OXYCODONE HYDROCHLORIDE 5 MG: 5 TABLET ORAL at 12:23

## 2023-02-03 RX ADMIN — CEFAZOLIN 2 G: 330 INJECTION, POWDER, FOR SOLUTION INTRAMUSCULAR; INTRAVENOUS at 10:47

## 2023-02-03 RX ADMIN — FENTANYL CITRATE 25 MCG: 0.05 INJECTION, SOLUTION INTRAMUSCULAR; INTRAVENOUS at 11:58

## 2023-02-03 RX ADMIN — Medication 4 MG: at 10:48

## 2023-02-03 RX ADMIN — ACETAMINOPHEN 650 MG: 325 TABLET ORAL at 08:58

## 2023-02-03 RX ADMIN — ONDANSETRON HYDROCHLORIDE 4 MG: 2 INJECTION, SOLUTION INTRAMUSCULAR; INTRAVENOUS at 11:01

## 2023-02-03 RX ADMIN — PROPOFOL 400 MG: 10 INJECTION, EMULSION INTRAVENOUS at 10:30

## 2023-02-03 RX ADMIN — FENTANYL CITRATE 25 MCG: 0.05 INJECTION, SOLUTION INTRAMUSCULAR; INTRAVENOUS at 11:53

## 2023-02-03 RX ADMIN — ONDANSETRON HYDROCHLORIDE 4 MG: 2 SOLUTION INTRAMUSCULAR; INTRAVENOUS at 12:04

## 2023-02-03 NOTE — H&P
Date of Surgery Update:  Minal Smith was seen and examined. History and physical has been reviewed. The patient has been examined.  There have been no significant clinical changes since the completion of the originally dated History and Physical.    Signed By: Shailesh Holt MD     February 3, 2023 8:04 AM

## 2023-02-03 NOTE — ANESTHESIA POSTPROCEDURE EVALUATION
Procedure(s):  LEFT KNEE ARTHROSCOPY WITH PARTIAL MENISCECTOMY. general    Anesthesia Post Evaluation        Patient location during evaluation: PACU  Patient participation: complete - patient participated  Level of consciousness: awake and alert  Pain management: adequate  Airway patency: patent  Anesthetic complications: no  Cardiovascular status: acceptable  Respiratory status: acceptable  Hydration status: acceptable  Comments: I have seen and evaluated the patient and is ready for discharge. Jamin Ann MD    Post anesthesia nausea and vomiting:  none      INITIAL Post-op Vital signs:   Vitals Value Taken Time   BP 96/55 02/03/23 1125   Temp     Pulse 59 02/03/23 1129   Resp 15 02/03/23 1129   SpO2 97 % 02/03/23 1129   Vitals shown include unvalidated device data.

## 2023-02-03 NOTE — PROGRESS NOTES
Discharge instructions reviewed with patient and family using teachback . All questions have been answered. Prescriptions sent to [facility] pharmacy. Vital signs stable, pain appropriately managed. Patient wheeled off the unit with PACU staff.

## 2023-02-03 NOTE — ROUTINE PROCESS
Patient: Anju Peralta MRN: [id number]  SSN: [id number]   YOB: 1975  Age: 52 y.o. Sex: female     Patient is status post Procedure(s):  LEFT KNEE ARTHROSCOPY WITH PARTIAL MENISCECTOMY. Surgeon(s) and Role:     Gely Lyons MD - Primary    Local/Dose/Irrigation: See eMAR                  Peripheral IV 02/03/23 Right Antecubital (Active)                           Dressing/Packing:  Incision 02/03/23 Leg Left-Dressing/Treatment: Gauze dressing/dressing sponge;ABD pad;Cast padding; Other (Comment) (Webril) (02/03/23 1100)    Splint/Cast:  N/A    Other:

## 2023-02-03 NOTE — BRIEF OP NOTE
Brief Postoperative Note    Patient: Julina Cohen  YOB: 1975  MRN: [id number]    Date of Procedure: 2/3/2023     Pre-Op Diagnosis: Acute medial meniscus tear of left knee, initial encounter [S83.242A]    Post-Op Diagnosis: Same as preoperative diagnosis.       Procedure(s):  LEFT KNEE ARTHROSCOPY WITH PARTIAL MENISCECTOMY    Surgeon(s):  Vesna Fan MD    Surgical Assistant: None    Anesthesia: General     Estimated Blood Loss (mL): Minimal    Complications: None    Specimens: * No specimens in log *     Implants: * No implants in log *    Drains: * No LDAs found *    Findings: radial tear mm    Electronically Signed by Gisela Gray MD on 2/3/2023 at 11:21 AM

## 2023-02-04 NOTE — OP NOTES
[facility]  OPERATIVE REPORT    Name:  Nika Fernández  MR#:  [id number]  :  1975  ACCOUNT #:  [de-identified]  DATE OF SERVICE:  2023      PREOPERATIVE DIAGNOSIS:  Medial meniscus tear of left knee. POSTOPERATIVE DIAGNOSIS:  Medial meniscus tear of left knee. PROCEDURE PERFORMED:  Arthroscopy of left knee, partial medial meniscectomy. SURGEON:  Tiffany Gonzalez MD    ASSISTANT:  None    ANESTHESIA:  General.    COMPLICATIONS:  None. SPECIMENS REMOVED:  None. IMPLANTS:  None. ESTIMATED BLOOD LOSS:  Minimal.    PREOPERATIVE ANTIBIOTIC:  Ancef 2 g. INDICATIONS:  A 52year-old woman with persistent catching and locking in her left knee. PROCEDURE:  Anesthetic was initiated. Preoperative dose of antibiotic was given. Left side was confirmed as the operative site, prepped and draped in the usual sterile fashion. Limb was exsanguinated. Tourniquet was inflated. Inferolateral portal was established. The medial portal was established under direct visualization and the knee was examined. She had a radial tear with large flap of meniscal tissue posterior horn. Images were obtained. Baskets were used to debride the flap. The rim of the meniscus was intact . The meniscal root was intact. Manuel Hilts was used to contour the meniscus and at this point, the joint medially was re-imaged. The articular cartilage of both femur and tibia were normal.  ACL and PCL were normal.  Lateral joint normal.  I did not examine the patellofemoral joint as she had no symptoms. The gutters were examined and there were no loose bodies. Knee joint was then copiously irrigated and drained. Cannulas were removed. Portals were closed with nylon sutures and injected with local anesthetic. There were no complications. No specimen. Procedure was arthroscopy left knee, partial medial meniscectomy. The patient was awakened and taken to the recovery room stable.         REBECCA Veronica Omer MD [email or web address]  D:  02/03/2023 11:25  T:  02/03/2023 21:48  JOB #:  [id number]

## 2023-02-05 NOTE — PROGRESS NOTES
Teodoro Ms. Rangel Seminole,     It was a pleasure to meet you and I hope that your surgery went well. I would like to inform you about your lab results. Your LDL \"bad cholesterol\" is slightly elevated at 103. Goal <100. I suspect this will improve once you are able to increase your exercise. Thyroid, kidney and liver function are normal.   Hep C is negative. Hgb A1c (diabetes level) is normal.   All your other labs are stable. Please let me know if you have any other question.      Best,   Sreedhar Nassar FNP-BC

## 2023-02-17 ENCOUNTER — OFFICE VISIT (OUTPATIENT)
Dept: ORTHOPEDIC SURGERY | Age: 48
End: 2023-02-17
Payer: OTHER GOVERNMENT

## 2023-02-17 DIAGNOSIS — Z98.890 STATUS POST ARTHROSCOPY OF LEFT KNEE: Primary | ICD-10-CM

## 2023-02-17 PROCEDURE — 99024 POSTOP FOLLOW-UP VISIT: CPT | Performed by: PHYSICIAN ASSISTANT

## 2023-02-17 NOTE — PROGRESS NOTES
Sarah Cooper (: 1975) is a 52 y.o. female, here for evaluation of the following chief complaint(s):  No chief complaint on file. SUBJECTIVE/OBJECTIVE:    Sarah Cooper (: 1975) is a 52 y.o. female. Left Knee Arthroscopy With Partial Meniscectomy - Left 2/3/2023     The patient states they are overall happy with how they have progressed so far following their procedure. They have been working diligently in physical therapy. Their pain has been tolerable. They are currently taking no medication for their pain. No Known Allergies    Current Outpatient Medications   Medication Sig    oxyCODONE IR (ROXICODONE) 5 mg immediate release tablet Take 1-2 Tablets by mouth every four (4) hours as needed for Pain for up to 14 days. Max Daily Amount: 60 mg.    senna-docusate (PERICOLACE) 8.6-50 mg per tablet Take 1 Tablet by mouth daily. No current facility-administered medications for this visit.        Social History     Socioeconomic History    Marital status:      Spouse name: Not on file    Number of children: Not on file    Years of education: Not on file    Highest education level: Not on file   Occupational History    Not on file   Tobacco Use    Smoking status: Never     Passive exposure: Never    Smokeless tobacco: Never   Vaping Use    Vaping Use: Never used   Substance and Sexual Activity    Alcohol use: Never    Drug use: Never    Sexual activity: Yes     Partners: Male     Birth control/protection: None     Comment: Only with my  of 26 years   Other Topics Concern    Not on file   Social History Narrative    Not on file     Social Determinants of Health     Financial Resource Strain: Not on file   Food Insecurity: Not on file   Transportation Needs: Not on file   Physical Activity: Sufficiently Active    Days of Exercise per Week: 7 days    Minutes of Exercise per Session: 60 min   Stress: Not on file   Social Connections: Not on file   Intimate Partner Violence: Not At Risk    Fear of Current or Ex-Partner: No    Emotionally Abused: No    Physically Abused: No    Sexually Abused: No   Housing Stability: Not on file       Past Surgical History:   Procedure Laterality Date    HX GYN  1997    LAPAROSCOPIC SURGERY FOR ENDOMETRIOSIS    HX HEENT Bilateral 2019    LASIK       Family History   Problem Relation Age of Onset    Cancer Mother         B CELL LYMPHOMA    Mitral valve prolapse Father     Cancer Brother         bone cancer    Anesth Problems Neg Hx         OB History    No obstetric history on file. REVIEW OF SYSTEMS:    Patient denies any recent fever, chills, nausea, vomiting, chest pain, abdominal pain, blurred vision, dizziness or shortness of breath. Vitals:    LMP 01/28/2023    There is no height or weight on file to calculate BMI. PHYSICAL EXAM:    The patient is alert and oriented x 3 and in no acute distress. The postoperative wound is healing well without erythema or drainage. Range of motion of the operative knee demonstrates full extension with flexion to 135 degrees. Mild swelling of the operative knee is noted. There is no calf tenderness to palpation. Distal motor and sensation is intact. IMAGING:    Results from Appointment encounter on 12/09/22    XR KNEE LT 3 V    Narrative  Views left knee. Normal joint space. No evidence of avascular necrosis, fracture, acute process. No orders of the defined types were placed in this encounter. ASSESSMENT/PLAN:      1. Status post arthroscopy of left knee        Below is the assessment and plan developed based on review of pertinent history, physical exam, labs, studies, and medications. I have discussed radiographic findings and have answered all patient questions to her satisfaction. Patient is doing very well following the procedure. We discussed physical therapy but she states she is back in the gym and doing her everyday activities just fine.   She will follow-up with us as needed. The patient was asked to contact the office with any questions or concerns. The patient understands and agrees to the treatment plan as outlined above. No follow-ups on file. Dr. Krista Barriga was available for immediate consultation as needed. An electronic signature was used to authenticate this note.   -- Katey Erazo PA-C

## 2024-03-18 ENCOUNTER — HOSPITAL ENCOUNTER (OUTPATIENT)
Facility: HOSPITAL | Age: 49
Discharge: HOME | End: 2024-03-21
Attending: ORTHOPAEDIC SURGERY
Payer: OTHER GOVERNMENT

## 2024-03-18 DIAGNOSIS — S83.231A COMPLEX TEAR OF MEDIAL MENISCUS OF RIGHT KNEE AS CURRENT INJURY, INITIAL ENCOUNTER: ICD-10-CM

## 2024-03-18 PROCEDURE — 73721 MRI JNT OF LWR EXTRE W/O DYE: CPT

## (undated) DEVICE — SOLUTION IRRIG 3000ML LAC RINGERS ARTHROMTC PLAS CONT

## (undated) DEVICE — 4-PORT MANIFOLD: Brand: NEPTUNE 2

## (undated) DEVICE — DYONICS 25 INFLOW/OUTFLOW TUBE                                    SET, SINGLE SUCTION, 3 PER BOX

## (undated) DEVICE — GLOVE ORTHO 8   MSG9480

## (undated) DEVICE — SUTURE NONABSORBABLE MONOFILAMENT 3-0 PS-1 18 IN BLK ETHILON 1663H

## (undated) DEVICE — ASPIRATOR FLR L72IN SUCT TB W/ 1 DETACH FISH STK PUSH HNDL

## (undated) DEVICE — ZIMMER® STERILE DISPOSABLE TOURNIQUET CUFF WITH PLC, DUAL PORT, SINGLE BLADDER, 24 IN. (61 CM)

## (undated) DEVICE — GLOVE SURG SZ 65 L12IN FNGR THK79MIL GRN LTX FREE

## (undated) DEVICE — PADDING CAST W4INXL4YD SPUN DACRON POLY POR SYN VERSATILE

## (undated) DEVICE — 4.5 MM INCISOR PLUS STRAIGHT                                    BLADES, POWER/EP-1, VIOLET, PACKAGED                                    6 PER BOX, STERILE

## (undated) DEVICE — ARTHROSCOPY - RICHMOND: Brand: MEDLINE INDUSTRIES, INC.

## (undated) DEVICE — SOLUTION SURG PREP 26 CC PURPREP

## (undated) DEVICE — GLOVE SURG SZ 65 L12IN FNGR THK94MIL STD WHT LTX FREE

## (undated) DEVICE — SPONGE GZ W4XL4IN COT 12 PLY TYP VII WVN C FLD DSGN STERILE